# Patient Record
Sex: FEMALE | Race: WHITE | Employment: FULL TIME | URBAN - METROPOLITAN AREA
[De-identification: names, ages, dates, MRNs, and addresses within clinical notes are randomized per-mention and may not be internally consistent; named-entity substitution may affect disease eponyms.]

---

## 2023-01-12 ENCOUNTER — OFFICE VISIT (OUTPATIENT)
Dept: ORTHOPEDIC SURGERY | Age: 28
End: 2023-01-12

## 2023-01-12 VITALS — BODY MASS INDEX: 27.32 KG/M2 | HEIGHT: 66 IN | WEIGHT: 170 LBS

## 2023-01-12 DIAGNOSIS — M79.671 RIGHT FOOT PAIN: Primary | ICD-10-CM

## 2023-01-12 DIAGNOSIS — S93.601A RIGHT FOOT SPRAIN, INITIAL ENCOUNTER: ICD-10-CM

## 2023-01-12 RX ORDER — ONDANSETRON 4 MG/1
4 TABLET, ORALLY DISINTEGRATING ORAL EVERY 6 HOURS PRN
COMMUNITY
Start: 2022-03-17

## 2023-01-12 RX ORDER — CHOLECALCIFEROL (VITAMIN D3) 1250 MCG
CAPSULE ORAL
COMMUNITY
Start: 2022-10-22

## 2023-01-12 RX ORDER — NAPROXEN 500 MG/1
500 TABLET ORAL 2 TIMES DAILY WITH MEALS
COMMUNITY
Start: 2023-01-10 | End: 2023-01-12

## 2023-01-12 RX ORDER — VENLAFAXINE HYDROCHLORIDE 37.5 MG/1
CAPSULE, EXTENDED RELEASE ORAL
COMMUNITY
Start: 2022-10-22

## 2023-01-12 RX ORDER — RIMEGEPANT SULFATE 75 MG/75MG
TABLET, ORALLY DISINTEGRATING ORAL
COMMUNITY
Start: 2022-11-01

## 2023-01-12 NOTE — PROGRESS NOTES
Gabrielle Jensen is seen today for an injury involving her right foot and ankle. She fell down the stairs after missing a step on January 10. She was seen in the emergency room at the [facility] diagnosed with avulsion fractures. She has been partial weightbearing on crutches. When using her crutches she is nearly pain-free but if she tries to bear weight without them pain is bad as 6 out of 10. 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 She has had an ankle sprain 2014 and a foot injury in 2009. Currently she is a  through fifth grade . She has migraine headaches but is otherwise healthy. She is accompanied today by her grandmother. History: Patient's relevant past family, medical, and social history are reviewed as part of today's visit. ROS of pertinent positives and negatives as above; otherwise negative. General Exam:    Vitals: Height 5' 6\" (1.676 m), weight 170 lb (77.1 kg). Constitutional: Patient is adequately groomed with no evidence of malnutrition  Mental Status: The patient is oriented to time, place and person. The patient's mood and affect are appropriate. Gait:  Patient walks with crutches  Lymphatic: The lymphatic examination bilaterally reveals all areas to be without enlargement or induration. Vascular: Examination reveals no swelling or calf tenderness. Peripheral pulses are palpable and 2+. Neurological: The patient has good coordination. There is no weakness or sensory deficit. Skin:    Head/Neck: inspection reveals no rashes, ulcerations or lesions. Trunk:  inspection reveals no rashes, ulcerations or lesions. Right Lower Extremity: inspection reveals no rashes, ulcerations or lesions. Left Lower Extremity: inspection reveals no rashes, ulcerations or lesions. Left ankle and foot are benign.     Right ankle is actually quite benign. She has some tenderness over the dorsal lateral right foot but no instability. Plantarflexion and dorsiflexion are intact with mild discomfort with dorsiflexion. She is ecchymosis on the dorsum of the foot but sensation is normal.  Calf is soft. Right Foot and ankle x-rays 3 views from [facility] are reviewed and demonstrate:      IMPRESSION:     Mildly displaced avulsion fractures along the dorsal navicular and anterolateral calcaneus. Report Verified by: Robert Benites MD at 1/10/2023 12:16 PM EST  Narrative    EXAM: XR FOOT RIGHT MINIMUM 3-VIEWS   EXAM: XR ANKLE RIGHT MINIMUM 3-VIEWS     INDICATION: Right foot pain     COMPARISON: None available. TECHNIQUE: 3 views each of the right foot and ankle     FINDINGS:     Ankle mortise alignment is maintained. The talar dome is intact. Punctate densities along the dorsum of the navicular. There are small avulsion fragments along the anterior lateral margin of the calcaneus. Lateral predominant hindfoot and midfoot soft tissue edema. No additional fractures are identified. Joint spaces are preserved. Assessment: Right foot sprain. I believe these avulsion injuries should heal uneventfully without surgery. Plan: We will treat her with a boot. She can bear weight to tolerance. Follow-up with me in about 2 weeks to determine the need for therapy. All questions have been answered and she agrees with this plan. Procedures    Anatoly / Leonard Neville Short Walking Boot     Patient was prescribed a Short Walking Fonality. The right foot will require stabilization / immobilization from this semi-rigid / rigid orthosis to improve their function. The orthosis will assist in protecting the affected area, provide functional support and facilitate healing. Patient was instructed to progress ambulation  as partial weight bearing in the device.      The patient was educated and fit by a healthcare professional with expert knowledge and specialization in brace application while under the direct supervision of the physician. Verbal and written instructions for the use of and application of this item were provided. They were instructed to contact the office immediately should the brace result in increased pain, decreased sensation, increased swelling or worsening of the condition.

## 2023-01-26 ENCOUNTER — OFFICE VISIT (OUTPATIENT)
Dept: ORTHOPEDIC SURGERY | Age: 28
End: 2023-01-26
Payer: COMMERCIAL

## 2023-01-26 VITALS — HEIGHT: 66 IN | WEIGHT: 170 LBS | BODY MASS INDEX: 27.32 KG/M2

## 2023-01-26 DIAGNOSIS — M79.671 RIGHT FOOT PAIN: ICD-10-CM

## 2023-01-26 DIAGNOSIS — S93.601D RIGHT FOOT SPRAIN, SUBSEQUENT ENCOUNTER: Primary | ICD-10-CM

## 2023-01-26 PROCEDURE — 99212 OFFICE O/P EST SF 10 MIN: CPT | Performed by: ORTHOPAEDIC SURGERY

## 2023-01-26 RX ORDER — ERENUMAB-AOOE 140 MG/ML
INJECTION, SOLUTION SUBCUTANEOUS
COMMUNITY
Start: 2022-08-01

## 2023-01-26 RX ORDER — ALMOTRIPTAN 6.25 MG/1
TABLET, FILM COATED ORAL
COMMUNITY

## 2023-01-26 NOTE — PROGRESS NOTES
Mahesh Beth returns today feeling much better. She is off her crutches. Pain is about 2 out of 10 after a long day at work. General Exam:    Vitals: Height 5' 6\" (1.676 m), weight 170 lb (77.1 kg). Constitutional: Patient is adequately groomed with no evidence of malnutrition  Mental Status: The patient is oriented to time, place and person. The patient's mood and affect are appropriate. Today right foot has some mild ecchymosis. She has very mild tenderness. She is dramatically improved. Assessment: Resolving right foot sprain. Plan: She can begin to wean from her boot as tolerated. She needs to be pain-free and weightbearing. She will start physical therapy. Follow-up with me in a few weeks.

## 2023-02-06 ENCOUNTER — HOSPITAL ENCOUNTER (OUTPATIENT)
Dept: PHYSICAL THERAPY | Age: 28
Setting detail: THERAPIES SERIES
Discharge: HOME | End: 2023-02-06
Payer: COMMERCIAL

## 2023-02-06 PROCEDURE — 97161 PT EVAL LOW COMPLEX 20 MIN: CPT | Performed by: PHYSICAL THERAPIST

## 2023-02-06 PROCEDURE — 97110 THERAPEUTIC EXERCISES: CPT | Performed by: PHYSICAL THERAPIST

## 2023-02-06 PROCEDURE — 97140 MANUAL THERAPY 1/> REGIONS: CPT | Performed by: PHYSICAL THERAPIST

## 2023-02-06 NOTE — FLOWSHEET NOTE
The [address], [address] [facility], [address], [place]    Physical Therapy Treatment Note/ Progress Report:           Date:  2023    Patient Name:  Vanad Salomon    :  1995  MRN: [id number]  Restrictions/Precautions:    Medical/Treatment Diagnosis Information:  Diagnosis: Right foot sprain (S93.601D)  Treatment Diagnosis: right ankle pain (M25.571), right foot pain (E00.701)  Insurance/Certification information:  PT Insurance Information: [facility]  Physician Information:   Dr. Cookie Aguirre  Has the plan of care been signed (Y/N):        []  Yes  [x]  No     Date of Patient follow up with Physician: will schedule for 2-3 weeks from today      Is this a Progress Report:     []  Yes  [x]  No        If Yes:  Date Range for reporting period:  Beginnin23  Ending    Progress report will be due (10 Rx or 30 days whichever is less):        Recertification will be due (POC Duration  / 90 days whichever is less):       Visit # Insurance Allowable Auth Required   In-person 1  []  Yes []  No    Telehealth   []  Yes []  No    Total          Functional Scale: LEFS 57/80 (29%)    Date assessed:  23      Therapy Diagnosis/Practice Pattern: D     Number of Comorbidities:  [x]0     []1-2    []3+    Latex Allergy:  [x]NO      []YES  Preferred Language for Healthcare:   [x]English       []other:      Pain level:  2-5/10     SUBJECTIVE:  See eval    OBJECTIVE: See eval  Observation:   Test measurements:      RESTRICTIONS/PRECAUTIONS:     Exercises/Interventions:     Therapeutic Ex (52597) and NMR re-education (56433)  Sets/sec Reps Notes/CUES   Standing gastroc stretch 30\" 3x    Ankle TB PF, EV, INV 2 10x ea    clamshells 3\" 10x Needs cueing for hip position   SLR abd 1 10x Needs cueing for hip position   Tandem balance on airex 15\" 5x Right foot behind   SLB 15\" 5x On ground         DHR 1 10x                      Patient ed   HEP, POC, ice, weaning out of boot gradually, gait mechanics                                             Manual Intervention (92989)      Posterior talocrural joint mobs, midfoot mobs 4'     Massage stick to gastroc, soleus 4'                                   Therapeutic Activity (23711)                                          HEP instruction:  Access Code: [email or web address]  URL: [email or web address]  Date: 02/06/2023  Prepared by: Azael Ruiz      Therapeutic Exercise and NMR EXR  [x] (50351) Provided verbal/tactile cueing for activities related to strengthening, flexibility, endurance, ROM for improvements in LE, proximal hip, and core control with self care, mobility, lifting, ambulation. [x] (17958) Provided verbal/tactile cueing for activities related to improving balance, coordination, kinesthetic sense, posture, motor skill, proprioception  to assist with LE, proximal hip, and core control in self care, mobility, lifting, ambulation and eccentric single leg control.      NMR and Therapeutic Activities:    [] (14169 or 18596) Provided verbal/tactile cueing for activities related to improving balance, coordination, kinesthetic sense, posture, motor skill, proprioception and motor activation to allow for proper function of core, proximal hip and LE with self care and ADLs  [] (34289) Gait Re-education- Provided training and instruction to the patient for proper LE, core and proximal hip recruitment and positioning and eccentric body weight control with ambulation re-education including up and down stairs     Home Exercise Program:    [x] (57849) Reviewed/Progressed HEP activities related to strengthening, flexibility, endurance, ROM of core, proximal hip and LE for functional self-care, mobility, lifting and ambulation/stair navigation   [] (54585)Reviewed/Progressed HEP activities related to improving balance, coordination, kinesthetic sense, posture, motor skill, proprioception of core, proximal hip and LE for self care, mobility, lifting, and ambulation/stair navigation      Manual Treatments:  PROM / STM / Oscillations-Mobs:  G-I, II, III, IV (PA's, Inf., Post.)  [x] (41736) Provided manual therapy to mobilize LE, proximal hip and/or LS spine soft tissue/joints for the purpose of modulating pain, promoting relaxation,  increasing ROM, reducing/eliminating soft tissue swelling/inflammation/restriction, improving soft tissue extensibility and allowing for proper ROM for normal function with self care, mobility, lifting and ambulation. Modalities:  none this date   [] GAME READY (VASO)- for significant edema, swelling, pain control. Charges  Timed Code Treatment Minutes: 24'   Total Treatment Minutes: 36'       [x] EVAL (LOW) 23051   [] EVAL (MOD) 09898  [] EVAL (HIGH) 87152   [] RE-EVAL     [x] XY(56719) x   1  [] IONTO  [] NMR (32146) x     [] VASO  [x] Manual (95794) x  1    [] Other:  [] TA x      [] Mech Traction (03210)  [] ES(attended) (72130)      [] ES (un) (35734):     GOALS:  Patient stated goal: \"regain range of motion and strength. \"  [] Progressing: [] Met: [] Not Met: [] Adjusted    Therapist goals for Patient:   Short Term Goals: To be achieved in: 2 weeks  1. Independent in HEP and progression per patient tolerance, in order to prevent re-injury. [] Progressing: [] Met: [] Not Met: [] Adjusted  2. Patient will have a decrease in pain to facilitate improvement in movement, function, and ADLs as indicated by Functional Deficits. [] Progressing: [] Met: [] Not Met: [] Adjusted    Long Term Goals: To be achieved in: 6 weeks  1. Disability index score of 15% or less for the LEFS to assist with reaching prior level of function. [] Progressing: [] Met: [] Not Met: [] Adjusted  2. Patient will demonstrate increased AROM of her right ankle DF to 10 deg and eversion to 15 deg to allow for proper joint functioning as indicated by patients Functional Deficits. [] Progressing: [] Met: [] Not Met: [] Adjusted  3.  Patient will demonstrate an increase in Strength in her right ankle to grossly 4+/5 to allow for proper functional mobility as indicated by patients Functional Deficits. [] Progressing: [] Met: [] Not Met: [] Adjusted  4. Patient will be able to ascend/descend stairs reciprocally without increased symptoms or restriction. [] Progressing: [] Met: [] Not Met: [] Adjusted  5. Patient will be able to ambulate 2 miles without increased symptoms or restrictions. (patient specific functional goal)    [] Progressing: [] Met: [] Not Met: [] Adjusted     Progression Towards Functional goals:  [] Patient is progressing as expected towards functional goals listed. [] Progression is slowed due to complexities listed. [] Progression has been slowed due to co-morbidities. [x] Plan just implemented, too soon to assess goals progression  [] Other:         Overall Progression Towards Functional goals/ Treatment Progress Update:  [] Patient is progressing as expected towards functional goals listed. [] Progression is slowed due to complexities/Impairments listed. [] Progression has been slowed due to co-morbidities.   [x] Plan just implemented, too soon to assess goals progression <30days   [] Goals require adjustment due to lack of progress  [] Patient is not progressing as expected and requires additional follow up with physician  [] Other    Prognosis for POC: [x] Good [] Fair  [] Poor      Patient requires continued skilled intervention: [x] Yes  [] No    Treatment/Activity Tolerance:  [x] Patient able to complete treatment  [] Patient limited by fatigue  [] Patient limited by pain    [] Patient limited by other medical complications  [] Other:     ASSESSMENT: see eval                     PLAN: See eval  [] Continue per plan of care [] Alter current plan (see comments above)  [x] Plan of care initiated [] Hold pending MD visit [] Discharge      Electronically signed by:  Perla Matamoros, PT, DPT [id number]     Note: If patient does not return for scheduled/ recommended follow up visits, this note will serve as a discharge from care along with most recent update on progress.

## 2023-02-06 NOTE — PLAN OF CARE
The [address] [facility] and [address] [facility], [address] [facility], [address] [facility]    Dear Dr. Brad Cantu ,    We had the pleasure of evaluating the following patient for physical therapy services at [address] [place] [address]. A summary of our findings can be found in the initial assessment below. This includes our plan of care. If you have any questions or concerns regarding these findings, please do not hesitate to contact me at the office phone number checked above. Thank you for the referral.       Physician Signature:_______________________________Date:__________________  By signing above (or electronic signature), therapists plan is approved by physician    Patient: Elizabeth Peters   : 1995   MRN: [id number]  Referring Physician:  Wilver      Evaluation Date: 2023      Medical Diagnosis Information:  Diagnosis: Right foot sprain (S93.601D)   Treatment Diagnosis: right ankle pain (M25.571), right foot pain (M79.671)                                         Insurance information: PT Insurance Information: BCBS     Precautions/ Contra-indications:     C-SSRS Triggered by Intake questionnaire (Past 2 wk assessment):   [x] No, Questionnaire did not trigger screening.   [] Yes, Patient intake triggered further evaluation      [] C-SSRS Screening completed  [] PCP notified via Plan of Care  [] Emergency services notified     Latex Allergy:  [x]NO      []YES  Preferred Language for Healthcare:   [x]English       []other:    SUBJECTIVE: Patient stated complaint:Patient reports that she missed a step and fell down her stairs and twisted her ankle about a month ago. Reports that she went to the doctor later that day and they did an x ray which showed a small avulsion fracture. Was given a boot and wore the boot for about 3 weeks. Reports that she started weaning out of the boot this past Saturday per MD's instruction.  Foot has been a little more sore since, but not terrible. Pain located on the outside of the ankle and into the foot. Some pain near her big toe. Feels like she cannot walk normally and it is hard to push off and take equal weight throughout her foot. 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     Living Status/Prior Level of Function: Independent with ADLs and IADLs, wants to return to hiking and walking for exercise    OBJECTIVE:     ROM LEFT RIGHT   HIP Flex     HIP Abd     HIP Ext     HIP IR     HIP ER     Knee ext     Knee Flex     Ankle PF  40   Ankle DF  7   Ankle In  35 (pain)   Ankle Ev  8   Strength  LEFT RIGHT   HIP Flexors     HIP Abductors     HIP Ext     Hip ER     Knee EXT (quad)     Knee Flex (HS)     Ankle DF  4+/5   Ankle PF  <3/5   Ankle Inv  4-/5   Ankle EV  4/5        Balance 10 sec stable surface min sway   10 sec stable surface mod sway       Reflexes/Sensation: DNT   []Dermatomes/Myotomes intact    [x]Reflexes equal and normal bilaterally   []Other:    Joint mobility:    []Normal    [x]Hypo talocrural joint, first metatarsal   []Hyper    Palpation: mild TTP lateral ligament complex, palpable tightness medial gastroc    Functional Mobility/Transfers: independent    Posture: fair    Bandages/Dressings/Incisions: N/A    Gait: (include devices/WB status) ambulating with decreased tibial advancement over talus and decreased stance time RLE    Orthopedic Special Tests: none this date                       [x] Patient history, allergies, meds reviewed. Medical chart reviewed. See intake form.      Review Of Systems (ROS):  [x]Performed Review of systems (Integumentary, CardioPulmonary, Neurological) by intake and observation. Intake form has been scanned into medical record. Patient has been instructed to contact their primary care physician regarding ROS issues if not already being addressed at this time. Co-morbidities/Complexities (which will affect course of rehabilitation):   [x]None           Arthritic conditions   []Rheumatoid arthritis (M05.9)  []Osteoarthritis (M19.91)   Cardiovascular conditions   []Hypertension (I10)  []Hyperlipidemia (E78.5)  []Angina pectoris (I20)  []Atherosclerosis (I70)   Musculoskeletal conditions   []Disc pathology   []Congenital spine pathologies   []Prior surgical intervention  []Osteoporosis (M81.8)  []Osteopenia (M85.8)   Endocrine conditions   []Hypothyroid (E03.9)  []Hyperthyroid Gastrointestinal conditions   []Constipation (L77.09)   Metabolic conditions   []Morbid obesity (E66.01)  []Diabetes type 1(E10.65) or 2 (E11.65)   []Neuropathy (G60.9)     Pulmonary conditions   []Asthma (J45)  []Coughing   []COPD (J44.9)   Psychological Disorders  []Anxiety (F41.9)  []Depression (F32.9)   []Other:   []Other:          Barriers to/and or personal factors that will affect rehab potential:              [x]Age  []Sex              []Motivation/Lack of Motivation                        [x]Co-Morbidities              []Cognitive Function, education/learning barriers              []Environmental, home barriers              []profession/work barriers  []past PT/medical experience  []other:  Justification: should progress well    Falls Risk Assessment (30 days):   [x] Falls Risk assessed and no intervention required. [] Falls Risk assessed and Patient requires intervention due to being higher risk   TUG score (>12s at risk):     [] Falls education provided, including           ASSESSMENT: Patient demonstrates decreased AROM and strength in her right ankle, limiting her ability to ambulate and ascend/descend stairs without pain.  Will benefit from skilled PT to address limitations. Functional Impairments:     [x]Noted lumbar/proximal hip/LE joint hypomobility   [x]Decreased LE functional ROM   [x]Decreased core/proximal hip strength and neuromuscular control   [x]Decreased LE functional strength   [x]Reduced balance/proprioceptive control   []other:      Functional Activity Limitations (from functional questionnaire and intake)   [x]Reduced ability to tolerate prolonged functional positions   []Reduced ability or difficulty with changes of positions or transfers between positions   []Reduced ability to maintain good posture and demonstrate good body mechanics with sitting, bending, and lifting   []Reduced ability to sleep   [] Reduced ability or tolerance with driving and/or computer work   []Reduced ability to perform lifting, carrying tasks   [x]Reduced ability to squat   []Reduced ability to forward bend   [x]Reduced ability to ambulate prolonged functional periods/distances/surfaces   [x]Reduced ability to ascend/descend stairs   [x]Reduced ability to run, hop, cut or jump   []other:    Participation Restrictions   []Reduced participation in self care activities   [x]Reduced participation in home management activities   []Reduced participation in work activities   []Reduced participation in social activities. [x]Reduced participation in sport/recreation activities. Classification :    []Signs/symptoms consistent with post-surgical status including decreased ROM, strength and function.    [x]Signs/symptoms consistent with joint sprain/strain   []Signs/symptoms consistent with patella-femoral syndrome   []Signs/symptoms consistent with knee OA/hip OA   []Signs/symptoms consistent with internal derangement of knee/Hip   []Signs/symptoms consistent with functional hip weakness/NMR control      []Signs/symptoms consistent with tendinitis/tendinosis    []signs/symptoms consistent with pathology which may benefit from Dry needling      []other:      Prognosis/Rehab Potential: []Excellent   [x]Good    []Fair   []Poor    Tolerance of evaluation/treatment:    [x]Excellent   []Good    []Fair   []Poor  Physical Therapy Evaluation Complexity Justification  [x] A history of present problem with:  [x] no personal factors and/or comorbidities that impact the plan of care;  []1-2 personal factors and/or comorbidities that impact the plan of care  []3 personal factors and/or comorbidities that impact the plan of care  [x] An examination of body systems using standardized tests and measures addressing any of the following: body structures and functions (impairments), activity limitations, and/or participation restrictions;:  [x] a total of 1-2 or more elements   [] a total of 3 or more elements   [] a total of 4 or more elements   [x] A clinical presentation with:  [x] stable and/or uncomplicated characteristics   [] evolving clinical presentation with changing characteristics  [] unstable and unpredictable characteristics;   [x] Clinical decision making of [x] low, [] moderate, [] high complexity using standardized patient assessment instrument and/or measurable assessment of functional outcome. [x] EVAL (LOW) 63253 (typically 20 minutes face-to-face)  [] EVAL (MOD) 23202 (typically 30 minutes face-to-face)  [] EVAL (HIGH) 29513 (typically 45 minutes face-to-face)  [] RE-EVAL       PLAN:   Frequency/Duration:  1 days per week for 6 Weeks:  Interventions:  [x]  Therapeutic exercise including: strength training, ROM, for Lower extremity and core   [x]  NMR activation and proprioception for LE, Glutes and Core   [x]  Manual therapy as indicated for LE, Hip and spine to include: Dry Needling/IASTM, STM, PROM, Gr I-IV mobilizations, manipulation. [x] Modalities as needed that may include: thermal agents, E-stim, Biofeedback, US, iontophoresis as indicated  [x] Patient education on joint protection, postural re-education, activity modification, progression of HEP.     HEP instruction:  Access Code: [email or web address]  URL: [email or web address]  Date: 02/06/2023  Prepared by: Addis Holland    GOALS:  Patient stated goal: \"regain range of motion and strength. \"  [] Progressing: [] Met: [] Not Met: [] Adjusted    Therapist goals for Patient:   Short Term Goals: To be achieved in: 2 weeks  1. Independent in HEP and progression per patient tolerance, in order to prevent re-injury. [] Progressing: [] Met: [] Not Met: [] Adjusted  2. Patient will have a decrease in pain to facilitate improvement in movement, function, and ADLs as indicated by Functional Deficits. [] Progressing: [] Met: [] Not Met: [] Adjusted    Long Term Goals: To be achieved in: 6 weeks  1. Disability index score of 15% or less for the LEFS to assist with reaching prior level of function. [] Progressing: [] Met: [] Not Met: [] Adjusted  2. Patient will demonstrate increased AROM of her right ankle DF to 10 deg and eversion to 15 deg to allow for proper joint functioning as indicated by patients Functional Deficits. [] Progressing: [] Met: [] Not Met: [] Adjusted  3. Patient will demonstrate an increase in Strength in her right ankle to grossly 4+/5 to allow for proper functional mobility as indicated by patients Functional Deficits. [] Progressing: [] Met: [] Not Met: [] Adjusted  4. Patient will be able to ascend/descend stairs reciprocally without increased symptoms or restriction. [] Progressing: [] Met: [] Not Met: [] Adjusted  5.  Patient will be able to ambulate 2 miles without increased symptoms or restrictions. (patient specific functional goal)    [] Progressing: [] Met: [] Not Met: [] Adjusted     Electronically signed by:  Nick Jones, PT, DPT [id number]

## 2023-02-13 ENCOUNTER — HOSPITAL ENCOUNTER (OUTPATIENT)
Dept: PHYSICAL THERAPY | Age: 28
Setting detail: THERAPIES SERIES
Discharge: HOME | End: 2023-02-13
Payer: COMMERCIAL

## 2023-02-13 NOTE — FLOWSHEET NOTE
The [address] [facility],[address], [address] [facility], [address] [place], [place]      Physical Therapy  Cancellation/No-show Note  Patient Name:  Shalonda Rincon  :  1995   Date:  2023  Cancelled visits to date: 1  No-shows to date: 0    For today's appointment patient:  [x]  Cancelled  [x]  Rescheduled appointment  []  No-show     Reason given by patient:  [x]  Patient ill  []  Conflicting appointment  []  No transportation    []  Conflict with work  []  No reason given  []  Other:     Comments:      Electronically signed by:  Cem Zhao PT

## 2023-02-15 ENCOUNTER — HOSPITAL ENCOUNTER (OUTPATIENT)
Dept: PHYSICAL THERAPY | Age: 28
Setting detail: THERAPIES SERIES
Discharge: HOME | End: 2023-02-15
Payer: COMMERCIAL

## 2023-02-15 PROCEDURE — 97110 THERAPEUTIC EXERCISES: CPT | Performed by: PHYSICAL THERAPIST

## 2023-02-15 PROCEDURE — 97140 MANUAL THERAPY 1/> REGIONS: CPT | Performed by: PHYSICAL THERAPIST

## 2023-02-15 NOTE — FLOWSHEET NOTE
The [address], [address], [address] [place], [place]    Physical Therapy Treatment Note/ Progress Report:           Date:  2/15/2023    Patient Name:  Sasha Antonio    :  1995  MRN: [id number]  Restrictions/Precautions:    Medical/Treatment Diagnosis Information:  Diagnosis: Right foot sprain (S93.601D)  Treatment Diagnosis: right ankle pain (M25.571), right foot pain (B81.837)  Insurance/Certification information:  PT Insurance Information: [facility]  Physician Information:   Dr. Adán Jordan  Has the plan of care been signed (Y/N):        [x]  Yes  []  No     Date of Patient follow up with Physician: will schedule for 2-3 weeks from today      Is this a Progress Report:     []  Yes  [x]  No        If Yes:  Date Range for reporting period:  Beginnin23  Ending    Progress report will be due (10 Rx or 30 days whichever is less):        Recertification will be due (POC Duration  / 90 days whichever is less):       Visit # Insurance Allowable Auth Required   In-person 2  []  Yes []  No    Telehealth   []  Yes []  No    Total          Functional Scale: LEFS 57/80 (29%)    Date assessed:  23      Therapy Diagnosis/Practice Pattern: D     Number of Comorbidities:  [x]0     []1-2    []3+    Latex Allergy:  [x]NO      []YES  Preferred Language for Healthcare:   [x]English       []other:      Pain level:  2-3/10     SUBJECTIVE:  Patient reports that her ankle is doing well. Is not wearing the boot anymore and has not felt much increased pain. Reports she did walk on a flat trail and noticed some soreness. Also notices soreness with walking quickly.     OBJECTIVE:   Observation: min TTP at lateral ligament complex, mild edema lateral ankle  Test measurements:  NT this date    RESTRICTIONS/PRECAUTIONS:     Exercises/Interventions:     Therapeutic Ex (27918) and NMR re-education ()  Sets/sec Reps Notes/CUES   Incline stretch 30\" 3x       Needs cueing for hip position   Needs cueing for hip position   Right foot behind   SLB on airex 15\" 5x    SLB with 3D reach 1 10x    SLB with trampoline plyoback  20x ea Chest, OH   Ecc heel raises 2 10x    Squats  1 10x    Squats on DD 1 10x    LBW GVL  3 laps  12' ea R/L                Patient ed 3'  HEP progression, continuing ice, general exercise program                                              Manual Intervention (32587)      Posterior talocrural joint mobs, 3'     IASTM to gastroc, soleus 5'     CFM lateral ligament complex 2'                             Therapeutic Activity (61897)                                          HEP instruction:  Access Code: [email or web address]  URL: [employer]/  Date: 02/06/2023  Prepared by: Segundo Soto      Therapeutic Exercise and NMR EXR  [x] (26362) Provided verbal/tactile cueing for activities related to strengthening, flexibility, endurance, ROM for improvements in LE, proximal hip, and core control with self care, mobility, lifting, ambulation. [x] (03931) Provided verbal/tactile cueing for activities related to improving balance, coordination, kinesthetic sense, posture, motor skill, proprioception  to assist with LE, proximal hip, and core control in self care, mobility, lifting, ambulation and eccentric single leg control.      NMR and Therapeutic Activities:    [] (96726 or 28965) Provided verbal/tactile cueing for activities related to improving balance, coordination, kinesthetic sense, posture, motor skill, proprioception and motor activation to allow for proper function of core, proximal hip and LE with self care and ADLs  [] (66687) Gait Re-education- Provided training and instruction to the patient for proper LE, core and proximal hip recruitment and positioning and eccentric body weight control with ambulation re-education including up and down stairs     Home Exercise Program:    [x] (92465) Reviewed/Progressed HEP activities related to strengthening, flexibility, endurance, ROM of core, proximal hip and LE for functional self-care, mobility, lifting and ambulation/stair navigation   [] (39710)Reviewed/Progressed HEP activities related to improving balance, coordination, kinesthetic sense, posture, motor skill, proprioception of core, proximal hip and LE for self care, mobility, lifting, and ambulation/stair navigation      Manual Treatments:  PROM / STM / Oscillations-Mobs:  G-I, II, III, IV (PA's, Inf., Post.)  [x] (25229) Provided manual therapy to mobilize LE, proximal hip and/or LS spine soft tissue/joints for the purpose of modulating pain, promoting relaxation,  increasing ROM, reducing/eliminating soft tissue swelling/inflammation/restriction, improving soft tissue extensibility and allowing for proper ROM for normal function with self care, mobility, lifting and ambulation. Modalities:  none this date   [] GAME READY (VASO)- for significant edema, swelling, pain control. Charges  Timed Code Treatment Minutes: 30'   Total Treatment Minutes: 30'       [] EVAL (LOW) 455 1011   [] EVAL (MOD) 67866  [] EVAL (HIGH) 74106   [] RE-EVAL     [x] ST(81957) x   1  [] IONTO  [] NMR (73007) x     [] VASO  [x] Manual (17771) x  1    [] Other:  [] TA x      [] Mech Traction (26447)  [] ES(attended) (29102)      [] ES (un) (39155):     GOALS:  Patient stated goal: \"regain range of motion and strength. \"  [] Progressing: [] Met: [] Not Met: [] Adjusted    Therapist goals for Patient:   Short Term Goals: To be achieved in: 2 weeks  1. Independent in HEP and progression per patient tolerance, in order to prevent re-injury. [] Progressing: [] Met: [] Not Met: [] Adjusted  2. Patient will have a decrease in pain to facilitate improvement in movement, function, and ADLs as indicated by Functional Deficits. [] Progressing: [] Met: [] Not Met: [] Adjusted    Long Term Goals: To be achieved in: 6 weeks  1.  Disability index score of 15% or less for the LEFS to assist with reaching prior level of function. [] Progressing: [] Met: [] Not Met: [] Adjusted  2. Patient will demonstrate increased AROM of her right ankle DF to 10 deg and eversion to 15 deg to allow for proper joint functioning as indicated by patients Functional Deficits. [] Progressing: [] Met: [] Not Met: [] Adjusted  3. Patient will demonstrate an increase in Strength in her right ankle to grossly 4+/5 to allow for proper functional mobility as indicated by patients Functional Deficits. [] Progressing: [] Met: [] Not Met: [] Adjusted  4. Patient will be able to ascend/descend stairs reciprocally without increased symptoms or restriction. [] Progressing: [] Met: [] Not Met: [] Adjusted  5. Patient will be able to ambulate 2 miles without increased symptoms or restrictions. (patient specific functional goal)    [] Progressing: [] Met: [] Not Met: [] Adjusted     Progression Towards Functional goals:  [] Patient is progressing as expected towards functional goals listed. [] Progression is slowed due to complexities listed. [] Progression has been slowed due to co-morbidities. [x] Plan just implemented, too soon to assess goals progression  [] Other:         Overall Progression Towards Functional goals/ Treatment Progress Update:  [] Patient is progressing as expected towards functional goals listed. [] Progression is slowed due to complexities/Impairments listed. [] Progression has been slowed due to co-morbidities.   [x] Plan just implemented, too soon to assess goals progression <30days   [] Goals require adjustment due to lack of progress  [] Patient is not progressing as expected and requires additional follow up with physician  [] Other    Prognosis for POC: [x] Good [] Fair  [] Poor      Patient requires continued skilled intervention: [x] Yes  [] No    Treatment/Activity Tolerance:  [x] Patient able to complete treatment  [] Patient limited by fatigue  [] Patient limited by pain    [] Patient limited by other medical complications  [] Other:     ASSESSMENT: Patient demonstrates improved stability in her ankle and less TTP than initial visit. She was able to perform eccentric heel raises without pain today. Challenged by dynamic balance exercises. Educated patient on the benefits of total LE strengthening for return to PLOF. PLAN: See eval  [x] Continue per plan of care [] Alter current plan (see comments above)  [] Plan of care initiated [] Hold pending MD visit [] Discharge      Electronically signed by:  Maria Alejandra Kong, PT, DPT [id number]     Note: If patient does not return for scheduled/ recommended follow up visits, this note will serve as a discharge from care along with most recent update on progress.

## 2023-02-22 ENCOUNTER — HOSPITAL ENCOUNTER (OUTPATIENT)
Dept: PHYSICAL THERAPY | Age: 28
Setting detail: THERAPIES SERIES
Discharge: HOME | End: 2023-02-22
Payer: COMMERCIAL

## 2023-02-22 PROCEDURE — 97140 MANUAL THERAPY 1/> REGIONS: CPT | Performed by: PHYSICAL THERAPIST

## 2023-02-22 PROCEDURE — 97112 NEUROMUSCULAR REEDUCATION: CPT | Performed by: PHYSICAL THERAPIST

## 2023-02-22 PROCEDURE — 97110 THERAPEUTIC EXERCISES: CPT | Performed by: PHYSICAL THERAPIST

## 2023-02-22 NOTE — PROGRESS NOTES
[facility] [address], [address] [place], [place]    Physical Therapy Treatment Note/ Progress Report:           Date:  2023    Patient Name:  Gianna Anthony    :  1995  MRN: [id number]  Restrictions/Precautions:    Medical/Treatment Diagnosis Information:  Diagnosis: Right foot sprain (S93.601D)  Treatment Diagnosis: right ankle pain (M25.571), right foot pain (F95.481)  Insurance/Certification information:  PT Insurance Information: [facility]  Physician Information:   Dr. Caleb Ford  Has the plan of care been signed (Y/N):        [x]  Yes  []  No     Date of Patient follow up with Physician: will schedule for 2-3 weeks from today      Is this a Progress Report:     [x]  Yes  []  No        If Yes:  Date Range for reporting period:  Beginnin23  Endin23    Progress report will be due (10 Rx or 30 days whichever is less):       Recertification will be due (POC Duration  / 90 days whichever is less):       Visit # Insurance Allowable Auth Required   In-person 3  []  Yes []  No    Telehealth   []  Yes []  No    Total          Functional Scale: LEFS 73/80 (9%)    Date assessed:  23      Therapy Diagnosis/Practice Pattern: D     Number of Comorbidities:  [x]0     []1-2    []3+    Latex Allergy:  [x]NO      []YES  Preferred Language for Healthcare:   [x]English       []other:      Pain level:  0-3/10     SUBJECTIVE:  Patient reports that her ankle feels good. No pain with daily activities. Went on a short hike over the weekend and it felt a little sore on uneven surfaces but it was mild.     OBJECTIVE:   Observation: No TTP lateral ligament complex, minimal edema noted today  Test measurements:  AROM right ankle DF 12 deg, PF 40 deg, INV 40 Deg, EV 20 deg; MMT: DF, INV, EV all 4+/5, PF 4/5    RESTRICTIONS/PRECAUTIONS:     Exercises/Interventions:     Therapeutic Ex (85223) and NMR re-education (10504)  Sets/sec Reps Notes/CUES Incline stretch gastroc, soleus 30\" 3x ea       Needs cueing for hip position   Needs cueing for hip position   Right foot behind   SLR flexion 2 10 ea R/L       Step to SLB fwd, lateral 1 10x ea    SLB with 3D reach 1 10x    SLB with trampoline plyoback  20x ea Chest, OH   Ecc heel raises 2 10x    SHR 1 10x    Squats on BOSU 1 15    Squats on DD 2 10x    LBW GVL  3 laps  12' ea R/L    Monster walks GVL 3 laps 12'     LSU and over BOSU 1 10x ea R/L    FSU and march on BOSU 3\" 10x    Patient ed 5'  HEP progression, progression of higher activities, ice, brace wear for hiking or more strenuous activiies                                             Manual Intervention (32851)      Posterior talocrural joint mobs, 3'     IASTM to gastroc, soleus 5'     CFM lateral ligament complex 2'                             Therapeutic Activity (30586)                                          HEP instruction:  Access Code: [email or web address]  URL: Apartment List/  Date: 02/06/2023  Prepared by: Theo Eubanks      Therapeutic Exercise and NMR EXR  [x] (12626) Provided verbal/tactile cueing for activities related to strengthening, flexibility, endurance, ROM for improvements in LE, proximal hip, and core control with self care, mobility, lifting, ambulation. [x] (63412) Provided verbal/tactile cueing for activities related to improving balance, coordination, kinesthetic sense, posture, motor skill, proprioception  to assist with LE, proximal hip, and core control in self care, mobility, lifting, ambulation and eccentric single leg control.      NMR and Therapeutic Activities:    [] (71746 or 74129) Provided verbal/tactile cueing for activities related to improving balance, coordination, kinesthetic sense, posture, motor skill, proprioception and motor activation to allow for proper function of core, proximal hip and LE with self care and ADLs  [] (28325) Gait Re-education- Provided training and instruction to the patient for proper LE, core and proximal hip recruitment and positioning and eccentric body weight control with ambulation re-education including up and down stairs     Home Exercise Program:    [x] (89657) Reviewed/Progressed HEP activities related to strengthening, flexibility, endurance, ROM of core, proximal hip and LE for functional self-care, mobility, lifting and ambulation/stair navigation   [] (66261)Reviewed/Progressed HEP activities related to improving balance, coordination, kinesthetic sense, posture, motor skill, proprioception of core, proximal hip and LE for self care, mobility, lifting, and ambulation/stair navigation      Manual Treatments:  PROM / STM / Oscillations-Mobs:  G-I, II, III, IV (PA's, Inf., Post.)  [x] (01713) Provided manual therapy to mobilize LE, proximal hip and/or LS spine soft tissue/joints for the purpose of modulating pain, promoting relaxation,  increasing ROM, reducing/eliminating soft tissue swelling/inflammation/restriction, improving soft tissue extensibility and allowing for proper ROM for normal function with self care, mobility, lifting and ambulation. Modalities:  none this date   [] GAME READY (VASO)- for significant edema, swelling, pain control. Charges  Timed Code Treatment Minutes: 43'   Total Treatment Minutes: 43'       [] EVAL (LOW) 11163   [] EVAL (MOD) 39149  [] EVAL (HIGH) 97804   [] RE-EVAL     [x] US(88322) x   1  [] IONTO  [x] NMR (66707) x  1   [] VASO  [x] Manual (43966) x  1    [] Other:  [] TA x      [] Mech Traction (34471)  [] ES(attended) (98913)      [] ES (un) (93511):     GOALS:  Patient stated goal: \"regain range of motion and strength. \"  [] Progressing: [x] Met: [] Not Met: [] Adjusted    Therapist goals for Patient:   Short Term Goals: To be achieved in: 2 weeks  1. Independent in HEP and progression per patient tolerance, in order to prevent re-injury. [] Progressing: [x] Met: [] Not Met: [] Adjusted  2.  Patient will have a decrease in pain to facilitate improvement in movement, function, and ADLs as indicated by Functional Deficits. [] Progressing: [x] Met: [] Not Met: [] Adjusted    Long Term Goals: To be achieved in: 6 weeks  1. Disability index score of 15% or less for the LEFS to assist with reaching prior level of function. [] Progressing: [x] Met: [] Not Met: [] Adjusted  2. Patient will demonstrate increased AROM of her right ankle DF to 10 deg and eversion to 15 deg to allow for proper joint functioning as indicated by patients Functional Deficits. [] Progressing: [x] Met: [] Not Met: [] Adjusted  3. Patient will demonstrate an increase in Strength in her right ankle to grossly 4+/5 to allow for proper functional mobility as indicated by patients Functional Deficits. [x] Progressing: [] Met: [] Not Met: [] Adjusted  4. Patient will be able to ascend/descend stairs reciprocally without increased symptoms or restriction. [] Progressing: [x] Met: [] Not Met: [] Adjusted  5. Patient will be able to ambulate 2 miles without increased symptoms or restrictions. (patient specific functional goal)    [] Progressing: [x] Met: [] Not Met: [] Adjusted     Progression Towards Functional goals:  [x] Patient is progressing as expected towards functional goals listed. [] Progression is slowed due to complexities listed. [] Progression has been slowed due to co-morbidities. [] Plan just implemented, too soon to assess goals progression  [] Other:         Overall Progression Towards Functional goals/ Treatment Progress Update:  [x] Patient is progressing as expected towards functional goals listed. [] Progression is slowed due to complexities/Impairments listed. [] Progression has been slowed due to co-morbidities.   [] Plan just implemented, too soon to assess goals progression <30days   [] Goals require adjustment due to lack of progress  [] Patient is not progressing as expected and requires additional follow up with physician  [] Other    Prognosis for POC: [x] Good [] Fair  [] Poor      Patient requires continued skilled intervention: [x] Yes  [] No    Treatment/Activity Tolerance:  [x] Patient able to complete treatment  [] Patient limited by fatigue  [] Patient limited by pain    [] Patient limited by other medical complications  [] Other:     ASSESSMENT: Patient able to perform higher level activities without pain except mild discomfort with step to SLB. She is pain-free with daily activities and has been able to return to easier hiking without much discomfort. Patient wishes to continue on her own at this time. Educated patient on the importance of continuing HEP including icing as she gradually increases her activities. Discussed how to progress balance and higher level strengthening exercises and a walk jog program in the future. Patient's biggest deficit is plantar flexion weakness, and this was addressed with HEP. She will schedule within 3-4 weeks if needed, otherwise will DC with HEP pending MD appt next week. PLAN: See eval  [] Continue per plan of care [] Alter current plan (see comments above)  [] Plan of care initiated [] Hold pending MD visit [x] Trial Discharge      Electronically signed by:  Filippo Neely, PT, DPT [id number]     Note: If patient does not return for scheduled/ recommended follow up visits, this note will serve as a discharge from care along with most recent update on progress.

## 2023-03-02 ENCOUNTER — OFFICE VISIT (OUTPATIENT)
Dept: ORTHOPEDIC SURGERY | Age: 28
End: 2023-03-02

## 2023-03-02 VITALS — WEIGHT: 170 LBS | HEIGHT: 66 IN | BODY MASS INDEX: 27.32 KG/M2

## 2023-03-02 DIAGNOSIS — M21.42 PES PLANUS OF BOTH FEET: ICD-10-CM

## 2023-03-02 DIAGNOSIS — S93.601D RIGHT FOOT SPRAIN, SUBSEQUENT ENCOUNTER: Primary | ICD-10-CM

## 2023-03-02 DIAGNOSIS — M21.41 PES PLANUS OF BOTH FEET: ICD-10-CM

## 2023-03-02 RX ORDER — NAPROXEN 500 MG/1
500 TABLET ORAL EVERY 12 HOURS PRN
COMMUNITY

## 2023-03-02 NOTE — PROGRESS NOTES
Lionel Sánchez returns today to follow-up her right ankle. She is doing a lot better. At the end of a long day she can have pain that is about 2 out of 10. She has been doing some hikes and feels a little bit apprehensive on uneven terrain. Her home exercise program is helpful. General Exam:    Vitals: Height 5' 6\" (1.676 m), weight 170 lb (77.1 kg). Constitutional: Patient is adequately groomed with no evidence of malnutrition  Mental Status: The patient is oriented to time, place and person. The patient's mood and affect are appropriate. She has moderate pes planus and lows the lateral ankle. She does have some mild pain over the ATFL but no gross instability. She has no swelling. Neurologic and vascular exam to the right foot are normal.    Assessment: Improving pain from right foot and ankle sprain. Plan: Because of her pes planus we will get her some power step inserts. She will also be fit with a lace up ankle brace that she can wear on uneven terrain. She will continue with her home exercises and follow-up with me on an as-needed basis. Procedures    ASO Tonja Ankle Brace     Patient was prescribed an ASO TONJA Ankle Brace. The right ankle will require stabilization / immobilization from this semi-rigid / rigid orthosis to improve their function. The orthosis will assist in protecting the affected area, provide functional support and facilitate healing. Patient was instructed to progress ambulation weight bearing as tolerated in the device. The patient was educated and fit by a healthcare professional with expert knowledge and specialization in brace application while under the direct supervision of the treating physician. Verbal and written instructions for the use of and application of this item were provided.    They were instructed to contact the office immediately should the brace result in increased pain, decreased sensation, increased swelling or worsening of the condition. Powerstep Protech Full Length Insert     Patient was prescribed Powerstep Protech Full Length Inserts. The right ankle will require stabilization / support from this semi-rigid / rigid orthosis to improve their function. The orthosis will assist in protecting the affected area, provide functional support and facilitate healing. The patient was educated and fit by a healthcare professional with expert knowledge and specialization in brace application while under the direct supervision of the treating physician. Verbal and written instructions for the use of and application of this item were provided. They were instructed to contact the office immediately should the brace result in increased pain, decreased sensation, increased swelling or worsening of the condition.